# Patient Record
Sex: MALE | Race: WHITE | NOT HISPANIC OR LATINO | Employment: FULL TIME | URBAN - METROPOLITAN AREA
[De-identification: names, ages, dates, MRNs, and addresses within clinical notes are randomized per-mention and may not be internally consistent; named-entity substitution may affect disease eponyms.]

---

## 2023-11-03 ENCOUNTER — OFFICE VISIT (OUTPATIENT)
Dept: URGENT CARE | Facility: CLINIC | Age: 28
End: 2023-11-03
Payer: COMMERCIAL

## 2023-11-03 VITALS
SYSTOLIC BLOOD PRESSURE: 124 MMHG | OXYGEN SATURATION: 98 % | BODY MASS INDEX: 27.06 KG/M2 | HEIGHT: 70 IN | WEIGHT: 189 LBS | TEMPERATURE: 98.4 F | DIASTOLIC BLOOD PRESSURE: 84 MMHG | HEART RATE: 94 BPM | RESPIRATION RATE: 14 BRPM

## 2023-11-03 DIAGNOSIS — R19.7 DIARRHEA, UNSPECIFIED TYPE: ICD-10-CM

## 2023-11-03 PROBLEM — G40.909 SEIZURE DISORDER (HCC): Status: ACTIVE | Noted: 2020-07-20

## 2023-11-03 LAB
FLUAV RNA SPEC QL NAA+PROBE: NEGATIVE
FLUBV RNA SPEC QL NAA+PROBE: NEGATIVE
RSV RNA SPEC QL NAA+PROBE: NEGATIVE
SARS-COV-2 RNA RESP QL NAA+PROBE: NEGATIVE

## 2023-11-03 PROCEDURE — 3074F SYST BP LT 130 MM HG: CPT | Performed by: FAMILY MEDICINE

## 2023-11-03 PROCEDURE — 0241U POCT CEPHEID COV-2, FLU A/B, RSV - PCR: CPT | Performed by: FAMILY MEDICINE

## 2023-11-03 PROCEDURE — 3079F DIAST BP 80-89 MM HG: CPT | Performed by: FAMILY MEDICINE

## 2023-11-03 PROCEDURE — 99213 OFFICE O/P EST LOW 20 MIN: CPT | Performed by: FAMILY MEDICINE

## 2023-11-03 RX ORDER — CLONAZEPAM 1 MG/1
TABLET ORAL
COMMUNITY
Start: 2023-10-09

## 2023-11-03 RX ORDER — LEVETIRACETAM 500 MG/1
TABLET ORAL
COMMUNITY
Start: 2023-09-14

## 2023-11-03 ASSESSMENT — ENCOUNTER SYMPTOMS
ABDOMINAL PAIN: 1
NAUSEA: 0
BLOOD IN STOOL: 0
DIARRHEA: 1
FEVER: 0
VOMITING: 0

## 2023-11-03 NOTE — PROGRESS NOTES
"Subjective:     John Lyles is a 27 y.o. male who presents for Diarrhea (Sx possibly from vacation trip x yesterday ), Headache (Sx head cold x yesterday ), and GI Problem (Sx stomach pains x yesterday )    HPI  Pt presents for evaluation of an acute illness  Pt with an acute illness which just started 2 days ago   Started with headaches   Then progressed to myalgias and abd cramping   Having diarrhea which is watery and non-bloody   Has had no nausea or vomiting   No fevers, but has had chills  No cough, sore throat, rhinorrhea  No sick contacts with similar symptoms  Recently in [place] and just returned from trip a few days ago     Review of Systems   Constitutional:  Negative for fever.   Gastrointestinal:  Positive for abdominal pain and diarrhea. Negative for blood in stool, melena, nausea and vomiting.     PMH:  has no past medical history on file.  MEDS:   Current Outpatient Medications:     clonazePAM (KLONOPIN) 1 MG Tab, , Disp: , Rfl:     levETIRAcetam (KEPPRA) 500 MG Tab, , Disp: , Rfl:   ALLERGIES: Not on File  SURGHX: History reviewed. No pertinent surgical history.     Objective:   /84 (BP Location: Left arm, Patient Position: Sitting, BP Cuff Size: Adult long)   Pulse 94   Temp 36.9 °C (98.4 °F) (Temporal)   Resp 14   Ht 1.778 m (5' 10\")   Wt 85.7 kg (189 lb)   SpO2 98%   BMI 27.12 kg/m²     Physical Exam  Constitutional:       General: He is not in acute distress.     Appearance: He is well-developed. He is not diaphoretic.   Pulmonary:      Effort: Pulmonary effort is normal.   Abdominal:      General: Abdomen is flat. Bowel sounds are normal. There is no distension.      Palpations: Abdomen is soft.      Tenderness: There is no abdominal tenderness. There is no right CVA tenderness, left CVA tenderness, guarding or rebound.   Neurological:      Mental Status: He is alert.       Assessment/Plan:   Assessment    1. Diarrhea, unspecified type  - POCT CEPHEID COV-2, FLU A/B, RSV - " PCR    Other orders  - clonazePAM (KLONOPIN) 1 MG Tab  - levETIRAcetam (KEPPRA) 500 MG Tab    Patient with diarrhea for the past 2 days.  Has no tenderness throughout the abdomen and no red flag symptoms which would suggest bacterial etiology.  He has had no vomiting and is tolerating p.o. intake.  Point-of-care influenza testing negative.  Reviewed supportive care measures and expected course of recovery.  He will follow-up in the urgent care on an as-needed basis.

## 2023-11-03 NOTE — LETTER
November 3, 2023    To Whom It May Concern:         This is confirmation that John Lyles attended his scheduled appointment with Manfred Kohler M.D. on 11/03/23.  He is recovering from an acute illness.  Please excuse him from days missed from work.  He is anticipated to likely miss work both tomorrow and 11/5.  If he is recovering more quickly than expected, he may return sooner.         If you have any questions please do not hesitate to call me at the phone number listed below.    Sincerely,          Manfred Kohler M.D.  [phone]

## 2023-11-13 ENCOUNTER — OFFICE VISIT (OUTPATIENT)
Dept: URGENT CARE | Facility: CLINIC | Age: 28
End: 2023-11-13
Payer: COMMERCIAL

## 2023-11-13 VITALS
HEART RATE: 106 BPM | DIASTOLIC BLOOD PRESSURE: 68 MMHG | HEIGHT: 70 IN | SYSTOLIC BLOOD PRESSURE: 124 MMHG | RESPIRATION RATE: 18 BRPM | TEMPERATURE: 99.4 F | WEIGHT: 191 LBS | BODY MASS INDEX: 27.35 KG/M2 | OXYGEN SATURATION: 99 %

## 2023-11-13 DIAGNOSIS — L08.9 SKIN INFECTION: ICD-10-CM

## 2023-11-13 PROCEDURE — 99213 OFFICE O/P EST LOW 20 MIN: CPT | Performed by: PHYSICIAN ASSISTANT

## 2023-11-13 PROCEDURE — 3074F SYST BP LT 130 MM HG: CPT | Performed by: PHYSICIAN ASSISTANT

## 2023-11-13 PROCEDURE — 3078F DIAST BP <80 MM HG: CPT | Performed by: PHYSICIAN ASSISTANT

## 2023-11-13 RX ORDER — SULFAMETHOXAZOLE AND TRIMETHOPRIM 800; 160 MG/1; MG/1
1 TABLET ORAL 2 TIMES DAILY
Qty: 14 TABLET | Refills: 0 | Status: SHIPPED | OUTPATIENT
Start: 2023-11-13 | End: 2023-11-20

## 2023-11-13 ASSESSMENT — ENCOUNTER SYMPTOMS
ABDOMINAL PAIN: 0
COUGH: 0
HEADACHES: 0
WHEEZING: 0
DIAPHORESIS: 0
NAUSEA: 0
CONSTIPATION: 0
SHORTNESS OF BREATH: 0
EYE PAIN: 0
DIZZINESS: 0
EYE DISCHARGE: 0
SORE THROAT: 0
CHILLS: 0
DIARRHEA: 0
VOMITING: 0
FEVER: 0
EYE REDNESS: 0
SINUS PAIN: 0

## 2023-11-14 NOTE — PROGRESS NOTES
"  Subjective:     John Lyles  is a 27 y.o. male who presents for Other (Patient states that he has a staph infection on his left leg. )       He presents today with complaints of a possible staph skin infection over the left lower leg.  Denies any trauma or injury associated with symptom onset.  Has had previous diagnosis of MRSA in the past.  He was in [place] recently and does admit to having gastroenteritis symptoms since that time, still has some intermittent diarrhea.  At this time he denies fever/chills/sweats, chest pain, shortness of breath, nausea/vomiting, abdominal pain       Review of Systems   Constitutional:  Negative for chills, diaphoresis, fever and malaise/fatigue.   HENT:  Negative for congestion, ear discharge, sinus pain and sore throat.    Eyes:  Negative for pain, discharge and redness.   Respiratory:  Negative for cough, shortness of breath and wheezing.    Cardiovascular:  Negative for chest pain.   Gastrointestinal:  Negative for abdominal pain, constipation, diarrhea, nausea and vomiting.   Skin:         Possible skin infection over the left lower leg   Neurological:  Negative for dizziness and headaches.      Not on File  History reviewed. No pertinent past medical history.     Objective:   /68 (BP Location: Left arm, Patient Position: Sitting, BP Cuff Size: Adult long)   Pulse (!) 106   Temp 37.4 °C (99.4 °F) (Temporal)   Resp 18   Ht 1.778 m (5' 10\")   Wt 86.6 kg (191 lb)   SpO2 99%   BMI 27.41 kg/m²   Physical Exam  Vitals and nursing note reviewed.   Constitutional:       General: He is not in acute distress.     Appearance: He is not ill-appearing or toxic-appearing.   HENT:      Head: Normocephalic.      Nose: No rhinorrhea.   Eyes:      General: No scleral icterus.     Conjunctiva/sclera: Conjunctivae normal.   Pulmonary:      Effort: Pulmonary effort is normal. No respiratory distress.      Breath sounds: No stridor.   Musculoskeletal:      Cervical back: Neck " supple.   Skin:            Comments: Localized area of erythema and skin induration over the above marked region of the left lower leg.  No evidence of abscess on exam today.  Did colton the borders of the skin induration with a skin marker during today's visit.   Neurological:      Mental Status: He is alert and oriented to person, place, and time.   Psychiatric:         Mood and Affect: Mood normal.         Behavior: Behavior normal.         Thought Content: Thought content normal.         Judgment: Judgment normal.             Diagnostic testing: None    Assessment/Plan:     Encounter Diagnoses   Name Primary?    Skin infection         Plan for care for today's complaint includes starting the patient on Bactrim for acute skin infection over the left lower leg.  Discussed with the patient to continue to use probiotics as he is still experiencing intermittent diarrhea asked to help limit diarrhea side effects from the antibiotic.  Continue to monitor symptoms and return to urgent care or follow-up with primary care provider if symptoms remain ongoing.  Follow-up in the emergency department if symptoms become severe, ER precautions discussed in office today..  Prescription for Bactrim provided.    See AVS Instructions below for written guidance provided to patient on after-visit management and care in addition to our verbal discussion during the visit.    Please note that this dictation was created using voice recognition software. I have attempted to correct all errors, but there may be sound-alike, spelling, grammar and possibly content errors that I did not discover before finalizing the note.    Fei Agee PA-C

## 2023-11-15 ENCOUNTER — OFFICE VISIT (OUTPATIENT)
Dept: URGENT CARE | Facility: CLINIC | Age: 28
End: 2023-11-15
Payer: COMMERCIAL

## 2023-11-15 VITALS
WEIGHT: 191 LBS | SYSTOLIC BLOOD PRESSURE: 122 MMHG | HEART RATE: 105 BPM | DIASTOLIC BLOOD PRESSURE: 60 MMHG | HEIGHT: 70 IN | OXYGEN SATURATION: 95 % | BODY MASS INDEX: 27.35 KG/M2 | TEMPERATURE: 99.2 F

## 2023-11-15 DIAGNOSIS — M79.89 LEG SWELLING: ICD-10-CM

## 2023-11-15 DIAGNOSIS — R00.0 TACHYCARDIA: ICD-10-CM

## 2023-11-15 DIAGNOSIS — R21 RASH AND NONSPECIFIC SKIN ERUPTION: ICD-10-CM

## 2023-11-15 PROCEDURE — 3074F SYST BP LT 130 MM HG: CPT | Performed by: REGISTERED NURSE

## 2023-11-15 PROCEDURE — 99214 OFFICE O/P EST MOD 30 MIN: CPT | Performed by: REGISTERED NURSE

## 2023-11-15 PROCEDURE — 3078F DIAST BP <80 MM HG: CPT | Performed by: REGISTERED NURSE

## 2023-11-15 ASSESSMENT — ENCOUNTER SYMPTOMS
CHILLS: 1
VOMITING: 0
MYALGIAS: 1
DIZZINESS: 0
BLOOD IN STOOL: 0
SHORTNESS OF BREATH: 0
ABDOMINAL PAIN: 0
FEVER: 0
HEADACHES: 0
DIARRHEA: 0

## 2023-11-15 NOTE — PROGRESS NOTES
Subjective:   John Lyles is a 27 y.o. male who presents for Foot Pain (Staph inf. L ankle x2 days. Foot pain onset x1 day. Extremity pain, arms/legs. Pt thinks he may be having a reaction to the antibiotic Batrim (red spots).)      HPI  Presenting for reevaluation after developing more painful red lesions on the body. Was seen in clinic on 11/13/2023 and started on bactrim for presumed staph infection, but at the time only had a single lesion on left lower extremity which is outlined in marker, over the past 2 days has developed diffuse erythemic areas of induration circular on the lower extremities 1 on the torso few on the upper extremities, also reports 12 out of 10 joint pain.  Was in [place] 3 weeks ago.  Not using OTC medications.  Does have a history of MRSA skin infection.    Review of Systems   Constitutional:  Positive for chills and malaise/fatigue. Negative for fever.   Respiratory:  Negative for shortness of breath.    Cardiovascular:  Negative for chest pain.   Gastrointestinal:  Negative for abdominal pain, blood in stool, diarrhea, melena and vomiting.   Musculoskeletal:  Positive for joint pain and myalgias.   Skin:  Positive for rash.   Neurological:  Negative for dizziness and headaches.         Patient Active Problem List    Diagnosis Date Noted    Seizure disorder (HCC) 07/20/2020       Current Outpatient Medications Ordered in Epic   Medication Sig Dispense Refill    sulfamethoxazole-trimethoprim (BACTRIM DS) 800-160 MG tablet Take 1 Tablet by mouth 2 times a day for 7 days. 14 Tablet 0    clonazePAM (KLONOPIN) 1 MG Tab       levETIRAcetam (KEPPRA) 500 MG Tab        No current Epic-ordered facility-administered medications on file.       No past surgical history on file.    Social History     Tobacco Use    Smoking status: Never    Smokeless tobacco: Never       family history is not on file.     Problem list, medications, and allergies reviewed by myself today in Epic.     Objective:   BP  "122/60 (BP Location: Left arm, Patient Position: Sitting, BP Cuff Size: Adult)   Pulse (!) 105   Temp 37.3 °C (99.2 °F) (Temporal)   Ht 1.778 m (5' 10\")   Wt 86.6 kg (191 lb)   SpO2 95%   BMI 27.41 kg/m²     Physical Exam  Vitals and nursing note reviewed.   Constitutional:       General: He is not in acute distress.     Appearance: Normal appearance. He is well-developed. He is ill-appearing. He is not diaphoretic.   HENT:      Head: Normocephalic.      Right Ear: Hearing and external ear normal.      Left Ear: Hearing and external ear normal.      Nose: Nose normal.      Mouth/Throat:      Mouth: Mucous membranes are moist.   Eyes:      General: No scleral icterus.     Conjunctiva/sclera: Conjunctivae normal.   Cardiovascular:      Rate and Rhythm: Normal rate and regular rhythm.   Pulmonary:      Effort: Pulmonary effort is normal. No respiratory distress.      Breath sounds: Normal breath sounds.   Musculoskeletal:      Cervical back: Normal range of motion and neck supple. No rigidity.      Left lower leg: Edema present.   Skin:     General: Skin is warm and dry.      Findings: Rash (Circular areas of induration that are erythemic various sizes, primarily on lower extremities but also one on trunk and a few on upper extremities) present.   Neurological:      General: No focal deficit present.      Mental Status: He is alert and oriented to person, place, and time.      Gait: Gait abnormal (Antalgic).   Psychiatric:         Mood and Affect: Mood normal.         Behavior: Behavior normal.         Thought Content: Thought content normal.         Judgment: Judgment normal.         Assessment/Plan:     1. Rash and nonspecific skin eruption        2. Leg swelling        3. Tachycardia          Differential diagnosis discussed     27-year-old presenting for reevaluation after visit 2 days ago when he was started on Bactrim for presumed staph infection.  At that time he had a single circular lesion on the lower " "extremity that was indurated and erythemic.  Since then has developed multiple more lesions primarily on the lower extremities few on the upper extremities more on the torso.  The lesions are painful.  He is also reporting \"12 out of 10\" joint pain.  Does have a history of MRSA.  Recent travel to [place].  Borderline febrile and he is tachycardic at 105.  He appears ill, neurologically intact, no oral lesions, no adventitious heart or lung sounds, no scleral icterus, does have scattered circular erythemic indurated lesions on the lower extremities and a couple on the upper extremities they are tender, also has left lower extremity swelling.  Given worsening of symptoms despite antibiotics and lesions not appearing like a typical drug rash as well as elevated vitals patient is being referred to the emergency department.  States he would likely go to the freestanding ER that is at the street.  Going private vehicle.    I personally reviewed prior external notes and test results pertinent to today's visit as well as additional imaging and testing completed in clinic today.     Please note that this dictation was created using voice recognition software. I have made every reasonable attempt to correct obvious errors, but I expect that there are errors of grammar and possibly content that I did not discover before finalizing the note.    This note was electronically signed by KAILA Brsyon.     "